# Patient Record
Sex: MALE | Race: WHITE | Employment: FULL TIME | URBAN - METROPOLITAN AREA
[De-identification: names, ages, dates, MRNs, and addresses within clinical notes are randomized per-mention and may not be internally consistent; named-entity substitution may affect disease eponyms.]

---

## 2020-12-23 ENCOUNTER — TELEPHONE (OUTPATIENT)
Dept: ADMINISTRATIVE | Age: 40
End: 2020-12-23

## 2020-12-23 NOTE — TELEPHONE ENCOUNTER
Pt moved back to the area. Was a prev pt of Dr. Dulce Bhatia. Would like to know if he would be able to reestablish care. Please contact pt back with further info.    @ [phone]  Thank You

## 2021-01-11 ENCOUNTER — TELEPHONE (OUTPATIENT)
Dept: ADMINISTRATIVE | Age: 41
End: 2021-01-11

## 2021-01-11 NOTE — TELEPHONE ENCOUNTER
Pt called a few wks ago and LM with after hours line for Dr. Marcos Reyes. Pt was est with him years ago. Moved back to area and would like to re-est if accepting. Please contact pt back @ [phone]. Can LM with wife Martin Chandra. Thank you in advanced!

## 2021-01-14 ENCOUNTER — OFFICE VISIT (OUTPATIENT)
Dept: PRIMARY CARE CLINIC | Age: 41
End: 2021-01-14
Payer: COMMERCIAL

## 2021-01-14 VITALS
HEIGHT: 69 IN | HEART RATE: 80 BPM | WEIGHT: 188 LBS | OXYGEN SATURATION: 97 % | BODY MASS INDEX: 27.85 KG/M2 | TEMPERATURE: 97.2 F | DIASTOLIC BLOOD PRESSURE: 70 MMHG | SYSTOLIC BLOOD PRESSURE: 110 MMHG

## 2021-01-14 DIAGNOSIS — K64.4 EXTERNAL HEMORRHOIDS: Primary | ICD-10-CM

## 2021-01-14 PROCEDURE — G8427 DOCREV CUR MEDS BY ELIG CLIN: HCPCS | Performed by: FAMILY MEDICINE

## 2021-01-14 PROCEDURE — 99202 OFFICE O/P NEW SF 15 MIN: CPT | Performed by: FAMILY MEDICINE

## 2021-01-14 PROCEDURE — 1036F TOBACCO NON-USER: CPT | Performed by: FAMILY MEDICINE

## 2021-01-14 PROCEDURE — G8484 FLU IMMUNIZE NO ADMIN: HCPCS | Performed by: FAMILY MEDICINE

## 2021-01-14 PROCEDURE — G8419 CALC BMI OUT NRM PARAM NOF/U: HCPCS | Performed by: FAMILY MEDICINE

## 2021-01-14 RX ORDER — CLINDAMYCIN HYDROCHLORIDE 150 MG/1
CAPSULE ORAL
COMMUNITY
Start: 2021-01-13 | End: 2022-02-18 | Stop reason: ALTCHOICE

## 2021-01-14 RX ORDER — IBUPROFEN 200 MG
200 TABLET ORAL EVERY 6 HOURS PRN
COMMUNITY
End: 2022-02-18 | Stop reason: ALTCHOICE

## 2021-01-14 SDOH — HEALTH STABILITY: MENTAL HEALTH: HOW MANY STANDARD DRINKS CONTAINING ALCOHOL DO YOU HAVE ON A TYPICAL DAY?: 1 OR 2

## 2021-01-14 SDOH — ECONOMIC STABILITY: TRANSPORTATION INSECURITY
IN THE PAST 12 MONTHS, HAS THE LACK OF TRANSPORTATION KEPT YOU FROM MEDICAL APPOINTMENTS OR FROM GETTING MEDICATIONS?: NOT ASKED

## 2021-01-14 SDOH — HEALTH STABILITY: MENTAL HEALTH: HOW OFTEN DO YOU HAVE A DRINK CONTAINING ALCOHOL?: 2-4 TIMES A MONTH

## 2021-01-14 SDOH — ECONOMIC STABILITY: TRANSPORTATION INSECURITY
IN THE PAST 12 MONTHS, HAS LACK OF TRANSPORTATION KEPT YOU FROM MEETINGS, WORK, OR FROM GETTING THINGS NEEDED FOR DAILY LIVING?: NOT ASKED

## 2021-01-14 SDOH — ECONOMIC STABILITY: FOOD INSECURITY: WITHIN THE PAST 12 MONTHS, YOU WORRIED THAT YOUR FOOD WOULD RUN OUT BEFORE YOU GOT MONEY TO BUY MORE.: NOT ASKED

## 2021-01-14 SDOH — ECONOMIC STABILITY: FOOD INSECURITY: WITHIN THE PAST 12 MONTHS, THE FOOD YOU BOUGHT JUST DIDN'T LAST AND YOU DIDN'T HAVE MONEY TO GET MORE.: NOT ASKED

## 2021-01-14 ASSESSMENT — ENCOUNTER SYMPTOMS
ABDOMINAL PAIN: 0
CONSTIPATION: 0
DIARRHEA: 0
BLOOD IN STOOL: 0

## 2021-01-14 ASSESSMENT — PATIENT HEALTH QUESTIONNAIRE - PHQ9
SUM OF ALL RESPONSES TO PHQ9 QUESTIONS 1 & 2: 0
SUM OF ALL RESPONSES TO PHQ QUESTIONS 1-9: 0

## 2021-01-14 NOTE — PROGRESS NOTES
Holly Barros, a male of 38 y.o. came to the office 1/14/2021. Patient Active Problem List   Diagnosis    Chronic right shoulder pain          HPI external hemorrhoids: noticed before [place] seen at urgent care and given Lidocaine and Prep H with relief. Doesn't sense them now. 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 No Known Allergies    Current Outpatient Medications on File Prior to Visit   Medication Sig Dispense Refill    ibuprofen (ADVIL;MOTRIN) 200 MG tablet Take 200 mg by mouth every 6 hours as needed for Pain      clindamycin (CLEOCIN) 150 MG capsule        No current facility-administered medications on file prior to visit. Review of Systems   Constitutional: Negative for fatigue and unexpected weight change. Gastrointestinal: Negative for abdominal pain, blood in stool, constipation and diarrhea. Endocrine: Negative for polydipsia and polyuria. other review of systems reviewed and are negative    OBJECTIVE:  /70   Pulse 80   Temp 97.2 °F (36.2 °C)   Ht 5' 9\" (1.753 m)   Wt 188 lb (85.3 kg)   SpO2 97%   BMI 27.76 kg/m²      Physical Exam  Vitals signs reviewed. Eyes:      General: No scleral icterus. Conjunctiva/sclera: Conjunctivae normal.   Neck:      Musculoskeletal: Neck supple. Thyroid: No thyromegaly. Vascular: No carotid bruit. Cardiovascular:      Rate and Rhythm: Normal rate and regular rhythm. Heart sounds: No murmur. Pulmonary:      Effort: Pulmonary effort is normal.      Breath sounds: Normal breath sounds. No wheezing or rales. Abdominal:      General: Bowel sounds are normal.      Palpations: Abdomen is soft. There is no mass. Tenderness: There is no abdominal tenderness. There is no guarding or rebound. Genitourinary:     Rectum: No external hemorrhoid. Musculoskeletal: Normal range of motion. Lymphadenopathy:      Cervical: No cervical adenopathy. Skin:     General: Skin is warm and dry. Neurological:      Mental Status: He is alert and oriented to person, place, and time. Psychiatric:         Mood and Affect: Mood normal.         ASSESSMENT AND PLAN:    Yoal Alegria was seen today for establish care and hemorrhoids. Diagnoses and all orders for this visit:    External hemorrhoids    - avoid straining exercises   - no treatment needed. Return if symptoms worsen or fail to improve.     Juni Gutierrez, DO

## 2021-01-18 ENCOUNTER — TELEPHONE (OUTPATIENT)
Dept: ADMINISTRATIVE | Age: 41
End: 2021-01-18

## 2021-03-04 ENCOUNTER — TELEPHONE (OUTPATIENT)
Dept: PRIMARY CARE CLINIC | Age: 41
End: 2021-03-04

## 2021-03-04 DIAGNOSIS — Z30.09 ENCOUNTER FOR VASECTOMY ASSESSMENT: Primary | ICD-10-CM

## 2021-07-12 ENCOUNTER — OFFICE VISIT (OUTPATIENT)
Dept: PRIMARY CARE CLINIC | Age: 41
End: 2021-07-12
Payer: COMMERCIAL

## 2021-07-12 VITALS
HEART RATE: 78 BPM | BODY MASS INDEX: 28.94 KG/M2 | DIASTOLIC BLOOD PRESSURE: 84 MMHG | TEMPERATURE: 97.7 F | WEIGHT: 196 LBS | OXYGEN SATURATION: 98 % | SYSTOLIC BLOOD PRESSURE: 128 MMHG

## 2021-07-12 DIAGNOSIS — Z80.42 FAMILY HISTORY OF PROSTATE CANCER: ICD-10-CM

## 2021-07-12 DIAGNOSIS — Z80.42 FAMILY HISTORY OF PROSTATE CANCER: Primary | ICD-10-CM

## 2021-07-12 LAB — PROSTATE SPECIFIC ANTIGEN: 0.6 NG/ML (ref 0–4)

## 2021-07-12 PROCEDURE — 99212 OFFICE O/P EST SF 10 MIN: CPT | Performed by: FAMILY MEDICINE

## 2021-07-12 PROCEDURE — G8427 DOCREV CUR MEDS BY ELIG CLIN: HCPCS | Performed by: FAMILY MEDICINE

## 2021-07-12 PROCEDURE — 1036F TOBACCO NON-USER: CPT | Performed by: FAMILY MEDICINE

## 2021-07-12 PROCEDURE — G8419 CALC BMI OUT NRM PARAM NOF/U: HCPCS | Performed by: FAMILY MEDICINE

## 2021-07-12 NOTE — PROGRESS NOTES
Elisabeth Srinivasan, a male of 38 y.o. came to the office 7/12/2021. Patient Active Problem List   Diagnosis    Chronic right shoulder pain          HPI : father recently diagnosed with prostate cancer at age 59. Brother age 55 also has elevated psa. No Known Allergies    Current Outpatient Medications on File Prior to Visit   Medication Sig Dispense Refill    ibuprofen (ADVIL;MOTRIN) 200 MG tablet Take 200 mg by mouth every 6 hours as needed for Pain      clindamycin (CLEOCIN) 150 MG capsule        No current facility-administered medications on file prior to visit. Review of Systems   Genitourinary: Negative for difficulty urinating, dysuria, flank pain, frequency, hematuria and urgency. other review of systems reviewed and are negative    OBJECTIVE:  /84   Pulse 78   Temp 97.7 °F (36.5 °C)   Wt 196 lb (88.9 kg)   SpO2 98%   BMI 28.94 kg/m²      Physical Exam  Vitals reviewed. Eyes:      General: No scleral icterus. Conjunctiva/sclera: Conjunctivae normal.   Neck:      Thyroid: No thyromegaly. Cardiovascular:      Rate and Rhythm: Normal rate and regular rhythm. Heart sounds: No murmur heard. Pulmonary:      Effort: Pulmonary effort is normal.      Breath sounds: Normal breath sounds. No wheezing or rales. Abdominal:      General: Bowel sounds are normal.      Palpations: Abdomen is soft. There is no mass. Tenderness: There is no abdominal tenderness. There is no guarding or rebound. Musculoskeletal:         General: Normal range of motion. Cervical back: Neck supple. Skin:     General: Skin is warm and dry. Neurological:      Mental Status: He is alert and oriented to person, place, and time. Psychiatric:         Mood and Affect: Mood normal.         ASSESSMENT AND PLAN:    Lorena Antunez was seen today for other. Diagnoses and all orders for this visit:    Family history of prostate cancer  -     PSA screening;  Future        Return for based on lab results, or for acute problem.     Ricky Gutierrez, DO

## 2022-02-18 ENCOUNTER — OFFICE VISIT (OUTPATIENT)
Dept: PRIMARY CARE CLINIC | Age: 42
End: 2022-02-18
Payer: COMMERCIAL

## 2022-02-18 VITALS
DIASTOLIC BLOOD PRESSURE: 84 MMHG | WEIGHT: 205 LBS | SYSTOLIC BLOOD PRESSURE: 130 MMHG | HEART RATE: 80 BPM | BODY MASS INDEX: 31.07 KG/M2 | HEIGHT: 68 IN | OXYGEN SATURATION: 96 % | TEMPERATURE: 97.3 F

## 2022-02-18 DIAGNOSIS — Z00.00 ANNUAL PHYSICAL EXAM: Primary | ICD-10-CM

## 2022-02-18 DIAGNOSIS — F34.1 DYSTHYMIA: ICD-10-CM

## 2022-02-18 PROCEDURE — 99396 PREV VISIT EST AGE 40-64: CPT | Performed by: FAMILY MEDICINE

## 2022-02-18 PROCEDURE — G8484 FLU IMMUNIZE NO ADMIN: HCPCS | Performed by: FAMILY MEDICINE

## 2022-02-18 RX ORDER — BUPROPION HYDROCHLORIDE 150 MG/1
150 TABLET ORAL EVERY MORNING
Qty: 30 TABLET | Refills: 1 | Status: SHIPPED
Start: 2022-02-18 | End: 2022-03-24 | Stop reason: SDUPTHER

## 2022-02-18 RX ORDER — ALBUTEROL SULFATE 90 UG/1
2 AEROSOL, METERED RESPIRATORY (INHALATION) EVERY 6 HOURS PRN
Qty: 18 G | Refills: 0 | Status: SHIPPED | OUTPATIENT
Start: 2022-02-18

## 2022-02-18 SDOH — ECONOMIC STABILITY: FOOD INSECURITY: WITHIN THE PAST 12 MONTHS, THE FOOD YOU BOUGHT JUST DIDN'T LAST AND YOU DIDN'T HAVE MONEY TO GET MORE.: NEVER TRUE

## 2022-02-18 SDOH — ECONOMIC STABILITY: FOOD INSECURITY: WITHIN THE PAST 12 MONTHS, YOU WORRIED THAT YOUR FOOD WOULD RUN OUT BEFORE YOU GOT MONEY TO BUY MORE.: NEVER TRUE

## 2022-02-18 ASSESSMENT — PATIENT HEALTH QUESTIONNAIRE - PHQ9
SUM OF ALL RESPONSES TO PHQ QUESTIONS 1-9: 11
9. THOUGHTS THAT YOU WOULD BE BETTER OFF DEAD, OR OF HURTING YOURSELF: 0
3. TROUBLE FALLING OR STAYING ASLEEP: 0
7. TROUBLE CONCENTRATING ON THINGS, SUCH AS READING THE NEWSPAPER OR WATCHING TELEVISION: 0
2. FEELING DOWN, DEPRESSED OR HOPELESS: 3
SUM OF ALL RESPONSES TO PHQ QUESTIONS 1-9: 11
5. POOR APPETITE OR OVEREATING: 2
1. LITTLE INTEREST OR PLEASURE IN DOING THINGS: 3
SUM OF ALL RESPONSES TO PHQ QUESTIONS 1-9: 11
SUM OF ALL RESPONSES TO PHQ9 QUESTIONS 1 & 2: 6
4. FEELING TIRED OR HAVING LITTLE ENERGY: 3
6. FEELING BAD ABOUT YOURSELF - OR THAT YOU ARE A FAILURE OR HAVE LET YOURSELF OR YOUR FAMILY DOWN: 0
8. MOVING OR SPEAKING SO SLOWLY THAT OTHER PEOPLE COULD HAVE NOTICED. OR THE OPPOSITE, BEING SO FIGETY OR RESTLESS THAT YOU HAVE BEEN MOVING AROUND A LOT MORE THAN USUAL: 0
10. IF YOU CHECKED OFF ANY PROBLEMS, HOW DIFFICULT HAVE THESE PROBLEMS MADE IT FOR YOU TO DO YOUR WORK, TAKE CARE OF THINGS AT HOME, OR GET ALONG WITH OTHER PEOPLE: 3
SUM OF ALL RESPONSES TO PHQ QUESTIONS 1-9: 11

## 2022-02-18 ASSESSMENT — SOCIAL DETERMINANTS OF HEALTH (SDOH): HOW HARD IS IT FOR YOU TO PAY FOR THE VERY BASICS LIKE FOOD, HOUSING, MEDICAL CARE, AND HEATING?: NOT HARD AT ALL

## 2022-02-18 NOTE — PROGRESS NOTES
SUBJECTIVE:    HPI: Ignacio Lenz  Was seen here today for   Chief Complaint   Patient presents with    Annual Exam    Depression    . He states they are dealing with decrease motivation, occas depression, fatigue, hypersomnolence, low appetite, slight inc agitation. Normal conc, and no guilt. Symptoms for past 2 yrs when covid hit. Past Medical History:   Diagnosis Date    Chronic right shoulder pain        Past Surgical History:   Procedure Laterality Date    WISDOM TOOTH EXTRACTION  1999       Family History   Problem Relation Age of Onset    Arthritis Mother     Cancer Father     Other Brother         skin       Prior to Admission medications    Medication Sig Start Date End Date Taking? Authorizing Provider   buPROPion (WELLBUTRIN XL) 150 MG extended release tablet Take 1 tablet by mouth every morning 2/18/22  Yes Bob Gutierrez DO   albuterol sulfate  (90 Base) MCG/ACT inhaler Inhale 2 puffs into the lungs every 6 hours as needed for Wheezing 2/18/22  Yes [address], DO       Patient has no known allergies. Social History     Tobacco Use    Smoking status: Never Smoker    Smokeless tobacco: Never Used   Substance Use Topics    Alcohol use: Yes         Review Of Systems:    Skin: no abnormal pigmentation, rash, scaling, itching, masses, hair or nail changes  Eyes: no blurring, diplopia, or eye pain  Ears/Nose/Throat: no hearing loss, tinnitus, vertigo, nosebleed, nasal congestion, rhinorrhea, sore throat  Respiratory: no cough, pleuritic chest pain, dyspnea, or wheezing.    occas sob since he had covid in Nov..  Cardiovascular: no chest pain, angina, dyspnea on exertion, orthopnea, PND, palpitations, or claudication  Gastrointestinal: no nausea, vomiting, heartburn, diarrhea, constipation, bloating,  abdominal pain, or blood per rectum  Genitourinary: no urinary urgency, frequency, dysuria, nocturia, hesitancy, or incontinence  Musculoskeletal: no arthritis, arthralgia, myalgia, weakness, or morning stiffness  Neurologic: no paralysis, paresis, paresthesia, seizures, tremors, or headaches  Hematologic/Lymphatic/Immunologic: no anemia, abnormal bleeding/bruising, fever, chills, night sweats, swollen glands, or unexplained weight loss  Endocrine: no heat or cold intolerance and no polyphagia, polydipsia, or polyuria      OBJECTIVE:    VS: /84   Pulse 80   Temp 97.3 °F (36.3 °C)   Ht 5' 8\" (1.727 m)   Wt 205 lb (93 kg)   SpO2 96%   BMI 31.17 kg/m²   General appearance: Alert, Awake, Oriented times 3, no distress  Skin: Warm and dry  Head: Normocephalic. No masses, lesions or tenderness noted  Eyes: Conjunctivae appear normal. PERRL  Ears: External ears normal, TM's clear and intact  Nose/Sinuses: Nares normal. Septum midline. Mucosa normal. No drainage  Oropharynx: Oropharynx clear with no exudate seen  Neck: Neck supple. No jugular venous distension, lymphadenopathy or thyromegaly Trachea midline. No carotid bruits  Lungs: Lungs clear to auscultation bilaterally. No rhonchi, crackles or wheezes  Heart: S1 S2  Regular rate and rhythm. No rub, murmur or gallop  Abdomen: Abdomen soft, non-tender. BS normal. No masses, organomegaly  Extremities: No edema, Peripheral pulses palpable  Musculoskeletal: Muscular strength appears intact. No joint effusion, tenderness, swelling or warmth  Neuro:  No focal motor or sensory deficits. 2+/4 L4 reflexes        ASSESSMENT/PLAN:  Alvaro Pierson was seen today for annual exam and depression. Diagnoses and all orders for this visit:    Annual physical exam    Dysthymia  -     buPROPion (WELLBUTRIN XL) 150 MG extended release tablet; Take 1 tablet by mouth every morning    Other orders  -     albuterol sulfate  (90 Base) MCG/ACT inhaler; Inhale 2 puffs into the lungs every 6 hours as needed for Wheezing     - refuses labs for his PE  - start Wellbutrin    Return in about 1 month (around 3/18/2022).     Payton Starr, DO

## 2022-03-18 ENCOUNTER — OFFICE VISIT (OUTPATIENT)
Dept: PRIMARY CARE CLINIC | Age: 42
End: 2022-03-18
Payer: COMMERCIAL

## 2022-03-18 VITALS
BODY MASS INDEX: 29.8 KG/M2 | DIASTOLIC BLOOD PRESSURE: 84 MMHG | WEIGHT: 196 LBS | SYSTOLIC BLOOD PRESSURE: 126 MMHG | TEMPERATURE: 98 F | OXYGEN SATURATION: 96 % | HEART RATE: 102 BPM

## 2022-03-18 DIAGNOSIS — F34.1 DYSTHYMIA: Primary | ICD-10-CM

## 2022-03-18 DIAGNOSIS — G43.119 INTRACTABLE MIGRAINE WITH AURA WITHOUT STATUS MIGRAINOSUS: ICD-10-CM

## 2022-03-18 PROCEDURE — G8417 CALC BMI ABV UP PARAM F/U: HCPCS | Performed by: FAMILY MEDICINE

## 2022-03-18 PROCEDURE — G8427 DOCREV CUR MEDS BY ELIG CLIN: HCPCS | Performed by: FAMILY MEDICINE

## 2022-03-18 PROCEDURE — G8484 FLU IMMUNIZE NO ADMIN: HCPCS | Performed by: FAMILY MEDICINE

## 2022-03-18 PROCEDURE — 1036F TOBACCO NON-USER: CPT | Performed by: FAMILY MEDICINE

## 2022-03-18 PROCEDURE — 99213 OFFICE O/P EST LOW 20 MIN: CPT | Performed by: FAMILY MEDICINE

## 2022-03-18 RX ORDER — UBROGEPANT 100 MG/1
TABLET ORAL
Qty: 2 TABLET | Refills: 0 | COMMUNITY
Start: 2022-03-18 | End: 2022-07-18 | Stop reason: ALTCHOICE

## 2022-03-18 ASSESSMENT — ENCOUNTER SYMPTOMS
BLURRED VISION: 1
PHOTOPHOBIA: 1
NAUSEA: 1
VOMITING: 1

## 2022-03-18 NOTE — PROGRESS NOTES
Monet Iglesias, a male of 42 y.o. came to the office 3/18/2022. Patient Active Problem List   Diagnosis    Chronic right shoulder pain    Dysthymia    Intractable migraine with aura without status migrainosus          Migraine   This is a chronic problem. The current episode started more than 1 year ago. Episode frequency: 3-4 a month. The pain is located in the retro-orbital region. The pain does not radiate. The pain quality is similar to prior headaches. The quality of the pain is described as throbbing and stabbing. The pain is severe. Associated symptoms include blurred vision, dizziness, nausea, photophobia and vomiting. Pertinent negatives include no phonophobia. Nothing aggravates the symptoms. He has tried NSAIDs for the symptoms. The treatment provided mild relief. depression: thinks he is doing better. More motivated, moods better. Wife has noticed improvement in moods. He's wanting to do things. Less tired. Not sleeping as much. appitite better. Feels 85 % better. No Known Allergies    Current Outpatient Medications on File Prior to Visit   Medication Sig Dispense Refill    buPROPion (WELLBUTRIN XL) 150 MG extended release tablet Take 1 tablet by mouth every morning 30 tablet 1    albuterol sulfate  (90 Base) MCG/ACT inhaler Inhale 2 puffs into the lungs every 6 hours as needed for Wheezing 18 g 0     No current facility-administered medications on file prior to visit. Review of Systems   Constitutional: Negative for activity change, appetite change and fatigue. Eyes: Positive for blurred vision and photophobia. Gastrointestinal: Positive for nausea and vomiting. Neurological: Positive for dizziness. Psychiatric/Behavioral: Positive for dysphoric mood. Negative for agitation, decreased concentration and sleep disturbance. The patient is not nervous/anxious.     other review of systems reviewed and are negative    OBJECTIVE:  /84   Pulse 102   Temp 98 °F (36.7 °C)   Wt 196 lb (88.9 kg)   SpO2 96%   BMI 29.80 kg/m²      Physical Exam  Constitutional:       General: He is not in acute distress. Appearance: Normal appearance. He is not ill-appearing, toxic-appearing or diaphoretic. HENT:      Head: Normocephalic. Eyes:      General: No scleral icterus. Pulmonary:      Effort: Pulmonary effort is normal.   Neurological:      Mental Status: He is alert and oriented to person, place, and time. Cranial Nerves: No cranial nerve deficit. Psychiatric:         Mood and Affect: Mood normal.         ASSESSMENT AND PLAN:    Aleksander Jensen was seen today for depression and follow-up. Diagnoses and all orders for this visit:    Dysthymia    Intractable migraine with aura without status migrainosus  -     Ubrogepant (UBRELVY) 100 MG TABS; One daily prn migraine    - continue current dose of Wellbutrin, call for refill.  - discussed daily meds for headache prevention  - ok to use ibu prn as well for headache. Return in about 4 months (around 7/18/2022).     Roopa Gutierrez,

## 2022-03-24 DIAGNOSIS — F34.1 DYSTHYMIA: ICD-10-CM

## 2022-03-24 RX ORDER — BUPROPION HYDROCHLORIDE 150 MG/1
150 TABLET ORAL EVERY MORNING
Qty: 30 TABLET | Refills: 3 | Status: SHIPPED
Start: 2022-03-24 | End: 2022-07-18 | Stop reason: SDUPTHER

## 2022-03-24 NOTE — TELEPHONE ENCOUNTER
----- Message from [facility] [place] [facility] [place] sent at 3/24/2022  7:55 AM EDT -----  Subject: Refill Request    QUESTIONS  Name of Medication? buPROPion (WELLBUTRIN XL) 150 MG extended release   tablet  Patient-reported dosage and instructions? daily  How many days do you have left? 0  Preferred Pharmacy? [facility]ACY #[id number]  Pharmacy phone number (if available)? [phone]  ---------------------------------------------------------------------------  --------------  CALL BACK INFO  What is the best way for the office to contact you? OK to leave message on   voicemail  Preferred Call Back Phone Number?  [phone]

## 2022-03-30 ENCOUNTER — TELEPHONE (OUTPATIENT)
Dept: PRIMARY CARE CLINIC | Age: 42
End: 2022-03-30

## 2022-03-30 RX ORDER — AMITRIPTYLINE HYDROCHLORIDE 10 MG/1
10 TABLET, FILM COATED ORAL NIGHTLY
Qty: 30 TABLET | Refills: 1 | Status: SHIPPED
Start: 2022-03-30 | End: 2022-05-23

## 2022-03-30 NOTE — TELEPHONE ENCOUNTER
Pt's wife called explaining that the sample given to him for migraines are not working for him. They would like to try the daily one that he can take as a preventive instead. Please advise.

## 2022-05-23 RX ORDER — AMITRIPTYLINE HYDROCHLORIDE 10 MG/1
10 TABLET, FILM COATED ORAL NIGHTLY
Qty: 30 TABLET | Refills: 1 | Status: SHIPPED
Start: 2022-05-23 | End: 2022-08-15

## 2022-07-18 ENCOUNTER — OFFICE VISIT (OUTPATIENT)
Dept: PRIMARY CARE CLINIC | Age: 42
End: 2022-07-18
Payer: COMMERCIAL

## 2022-07-18 VITALS
BODY MASS INDEX: 29.95 KG/M2 | DIASTOLIC BLOOD PRESSURE: 80 MMHG | SYSTOLIC BLOOD PRESSURE: 110 MMHG | HEART RATE: 82 BPM | WEIGHT: 197 LBS | TEMPERATURE: 97.2 F | OXYGEN SATURATION: 98 %

## 2022-07-18 DIAGNOSIS — G43.119 INTRACTABLE MIGRAINE WITH AURA WITHOUT STATUS MIGRAINOSUS: Primary | ICD-10-CM

## 2022-07-18 DIAGNOSIS — F34.1 DYSTHYMIA: ICD-10-CM

## 2022-07-18 PROCEDURE — 1036F TOBACCO NON-USER: CPT | Performed by: FAMILY MEDICINE

## 2022-07-18 PROCEDURE — G8417 CALC BMI ABV UP PARAM F/U: HCPCS | Performed by: FAMILY MEDICINE

## 2022-07-18 PROCEDURE — G8427 DOCREV CUR MEDS BY ELIG CLIN: HCPCS | Performed by: FAMILY MEDICINE

## 2022-07-18 PROCEDURE — 99213 OFFICE O/P EST LOW 20 MIN: CPT | Performed by: FAMILY MEDICINE

## 2022-07-18 RX ORDER — TOPIRAMATE 25 MG/1
TABLET ORAL
Qty: 80 TABLET | Refills: 0 | Status: SHIPPED | OUTPATIENT
Start: 2022-07-18 | End: 2022-08-15

## 2022-07-18 RX ORDER — RIMEGEPANT SULFATE 75 MG/75MG
75 TABLET, ORALLY DISINTEGRATING ORAL DAILY PRN
Qty: 2 TABLET | Refills: 0 | COMMUNITY
Start: 2022-07-18

## 2022-07-18 RX ORDER — BUPROPION HYDROCHLORIDE 150 MG/1
150 TABLET ORAL EVERY MORNING
Qty: 30 TABLET | Refills: 5 | Status: SHIPPED | OUTPATIENT
Start: 2022-07-18

## 2022-07-18 ASSESSMENT — ENCOUNTER SYMPTOMS
NAUSEA: 0
VOMITING: 0
PHOTOPHOBIA: 0

## 2022-08-15 RX ORDER — AMITRIPTYLINE HYDROCHLORIDE 10 MG/1
10 TABLET, FILM COATED ORAL NIGHTLY
Qty: 30 TABLET | Refills: 2 | Status: SHIPPED | OUTPATIENT
Start: 2022-08-15

## 2022-11-15 RX ORDER — AMITRIPTYLINE HYDROCHLORIDE 10 MG/1
10 TABLET, FILM COATED ORAL NIGHTLY
Qty: 30 TABLET | Refills: 1 | Status: SHIPPED | OUTPATIENT
Start: 2022-11-15

## 2023-01-17 RX ORDER — AMITRIPTYLINE HYDROCHLORIDE 10 MG/1
10 TABLET, FILM COATED ORAL NIGHTLY
Qty: 30 TABLET | Refills: 0 | Status: SHIPPED
Start: 2023-01-17 | End: 2023-02-16

## 2023-02-10 ENCOUNTER — OFFICE VISIT (OUTPATIENT)
Dept: PRIMARY CARE CLINIC | Age: 43
End: 2023-02-10
Payer: COMMERCIAL

## 2023-02-10 VITALS
TEMPERATURE: 98 F | WEIGHT: 202.5 LBS | SYSTOLIC BLOOD PRESSURE: 120 MMHG | HEART RATE: 80 BPM | HEIGHT: 68 IN | OXYGEN SATURATION: 97 % | RESPIRATION RATE: 16 BRPM | DIASTOLIC BLOOD PRESSURE: 86 MMHG | BODY MASS INDEX: 30.69 KG/M2

## 2023-02-10 DIAGNOSIS — N50.819 EPIDIDYMAL PAIN: Primary | ICD-10-CM

## 2023-02-10 PROCEDURE — G8427 DOCREV CUR MEDS BY ELIG CLIN: HCPCS | Performed by: FAMILY MEDICINE

## 2023-02-10 PROCEDURE — 1036F TOBACCO NON-USER: CPT | Performed by: FAMILY MEDICINE

## 2023-02-10 PROCEDURE — G8417 CALC BMI ABV UP PARAM F/U: HCPCS | Performed by: FAMILY MEDICINE

## 2023-02-10 PROCEDURE — G8484 FLU IMMUNIZE NO ADMIN: HCPCS | Performed by: FAMILY MEDICINE

## 2023-02-10 PROCEDURE — 99212 OFFICE O/P EST SF 10 MIN: CPT | Performed by: FAMILY MEDICINE

## 2023-02-10 RX ORDER — ALBUTEROL SULFATE 90 UG/1
2 AEROSOL, METERED RESPIRATORY (INHALATION) EVERY 6 HOURS PRN
Qty: 18 G | Refills: 0 | Status: SHIPPED | OUTPATIENT
Start: 2023-02-10

## 2023-02-10 RX ORDER — IBUPROFEN 200 MG
800 TABLET ORAL AS NEEDED
COMMUNITY

## 2023-02-10 ASSESSMENT — PATIENT HEALTH QUESTIONNAIRE - PHQ9
5. POOR APPETITE OR OVEREATING: 0
9. THOUGHTS THAT YOU WOULD BE BETTER OFF DEAD, OR OF HURTING YOURSELF: 0
6. FEELING BAD ABOUT YOURSELF - OR THAT YOU ARE A FAILURE OR HAVE LET YOURSELF OR YOUR FAMILY DOWN: 0
7. TROUBLE CONCENTRATING ON THINGS, SUCH AS READING THE NEWSPAPER OR WATCHING TELEVISION: 0
SUM OF ALL RESPONSES TO PHQ QUESTIONS 1-9: 0
1. LITTLE INTEREST OR PLEASURE IN DOING THINGS: 0
SUM OF ALL RESPONSES TO PHQ9 QUESTIONS 1 & 2: 0
SUM OF ALL RESPONSES TO PHQ QUESTIONS 1-9: 0
8. MOVING OR SPEAKING SO SLOWLY THAT OTHER PEOPLE COULD HAVE NOTICED. OR THE OPPOSITE, BEING SO FIGETY OR RESTLESS THAT YOU HAVE BEEN MOVING AROUND A LOT MORE THAN USUAL: 0
SUM OF ALL RESPONSES TO PHQ QUESTIONS 1-9: 0
3. TROUBLE FALLING OR STAYING ASLEEP: 0
4. FEELING TIRED OR HAVING LITTLE ENERGY: 0
SUM OF ALL RESPONSES TO PHQ QUESTIONS 1-9: 0
2. FEELING DOWN, DEPRESSED OR HOPELESS: 0

## 2023-02-10 NOTE — PROGRESS NOTES
Adelina Holly, a male of 36 y.o. came to the office 2/10/2023. Patient Active Problem List   Diagnosis    Chronic right shoulder pain    Dysthymia    Intractable migraine with aura without status migrainosus          Testicle Pain  This is a new problem. The current episode started in the past 7 days (began several days ago. has had another episode in past.). The problem has been rapidly improving. Pertinent negatives include no chills, fever, rash or urinary symptoms. The symptoms are aggravated by intercourse and standing. He has tried nothing (but had sex 2 days ago and pain is getting better.) for the symptoms. The treatment provided moderate relief. - no mass. No Known Allergies    Current Outpatient Medications on File Prior to Visit   Medication Sig Dispense Refill    ibuprofen (ADVIL;MOTRIN) 200 MG tablet Take 800 mg by mouth as needed for Pain      amitriptyline (ELAVIL) 10 MG tablet TAKE 1 TABLET BY MOUTH NIGHTLY 30 tablet 0    buPROPion (WELLBUTRIN XL) 150 MG extended release tablet Take 1 tablet by mouth every morning 30 tablet 5    topiramate (TOPAMAX) 25 MG tablet Take 1 tablet by mouth daily for 7 days, THEN 1 tablet 2 times daily for 7 days, THEN 2 tablets 2 times daily for 14 days. (Patient not taking: Reported on 2/10/2023) 80 tablet 0    Rimegepant Sulfate (NURTEC) 75 MG TBDP Take 75 mg by mouth daily as needed (headache) (Patient not taking: Reported on 2/10/2023) 2 tablet 0     No current facility-administered medications on file prior to visit. Review of Systems   Constitutional:  Negative for chills and fever. Genitourinary:  Positive for testicular pain. Negative for dysuria, frequency, hematuria, penile discharge, penile pain and urgency. Skin:  Negative for rash.  other review of systems reviewed and are negative    OBJECTIVE:  /86   Pulse 80   Temp 98 °F (36.7 °C)   Resp 16   Ht 5' 8\" (1.727 m)   Wt 202 lb 8 oz (91.9 kg)   SpO2 97%   BMI 30.79 kg/m² Physical Exam  Constitutional:       General: He is not in acute distress. Appearance: Normal appearance. He is not ill-appearing, toxic-appearing or diaphoretic. HENT:      Head: Normocephalic. Eyes:      General: No scleral icterus. Pulmonary:      Effort: Pulmonary effort is normal.   Genitourinary:     Testes:         Right: Tenderness (to palp over epididymitis) present. Mass, testicular hydrocele or varicocele not present. Left: Mass, tenderness, testicular hydrocele or varicocele not present. Epididymis:      Right: Tenderness present. Left: No tenderness. Neurological:      Mental Status: He is alert and oriented to person, place, and time. Psychiatric:         Mood and Affect: Mood normal.       ASSESSMENT AND PLAN:    Pauline Stage was seen today for testicle pain. Diagnoses and all orders for this visit:    Epididymal pain    Other orders  -     albuterol sulfate HFA (PROVENTIL;VENTOLIN;PROAIR) 108 (90 Base) MCG/ACT inhaler; Inhale 2 puffs into the lungs every 6 hours as needed for Wheezing  - monitor pain if returns and doesn't resolve in 1 wk then call. Return if symptoms worsen or fail to improve.     Carly Gutierrez, DO

## 2023-08-08 DIAGNOSIS — F34.1 DYSTHYMIA: ICD-10-CM

## 2023-08-08 RX ORDER — BUPROPION HYDROCHLORIDE 150 MG/1
TABLET ORAL
Qty: 30 TABLET | Refills: 5 | Status: SHIPPED | OUTPATIENT
Start: 2023-08-08

## 2023-08-11 DIAGNOSIS — G43.119 INTRACTABLE MIGRAINE WITH AURA WITHOUT STATUS MIGRAINOSUS: ICD-10-CM

## 2023-08-11 RX ORDER — AMITRIPTYLINE HYDROCHLORIDE 10 MG/1
10 TABLET, FILM COATED ORAL NIGHTLY
Qty: 30 TABLET | Refills: 5 | Status: SHIPPED | OUTPATIENT
Start: 2023-08-11

## 2024-03-13 ENCOUNTER — HOSPITAL ENCOUNTER (EMERGENCY)
Age: 44
Discharge: HOME | End: 2024-03-13
Attending: STUDENT IN AN ORGANIZED HEALTH CARE EDUCATION/TRAINING PROGRAM
Payer: COMMERCIAL

## 2024-03-13 VITALS
OXYGEN SATURATION: 98 % | WEIGHT: 190 LBS | HEIGHT: 67 IN | RESPIRATION RATE: 14 BRPM | BODY MASS INDEX: 29.82 KG/M2 | DIASTOLIC BLOOD PRESSURE: 84 MMHG | HEART RATE: 84 BPM | SYSTOLIC BLOOD PRESSURE: 122 MMHG | TEMPERATURE: 98.4 F

## 2024-03-13 DIAGNOSIS — K04.7 DENTAL ABSCESS: Primary | ICD-10-CM

## 2024-03-13 LAB
ANION GAP SERPL CALCULATED.3IONS-SCNC: 10 MMOL/L (ref 7–16)
BASOPHILS # BLD: 0.04 K/UL (ref 0–0.2)
BASOPHILS NFR BLD: 1 % (ref 0–2)
BUN SERPL-MCNC: 10 MG/DL (ref 6–20)
CALCIUM SERPL-MCNC: 9 MG/DL (ref 8.6–10.2)
CHLORIDE SERPL-SCNC: 103 MMOL/L (ref 98–107)
CO2 SERPL-SCNC: 25 MMOL/L (ref 22–29)
CREAT SERPL-MCNC: 1.1 MG/DL (ref 0.7–1.2)
EOSINOPHIL # BLD: 0.02 K/UL (ref 0.05–0.5)
EOSINOPHILS RELATIVE PERCENT: 0 % (ref 0–6)
ERYTHROCYTE [DISTWIDTH] IN BLOOD BY AUTOMATED COUNT: 13.2 % (ref 11.5–15)
GFR SERPL CREATININE-BSD FRML MDRD: >60 ML/MIN/1.73M2
GLUCOSE SERPL-MCNC: 114 MG/DL (ref 74–99)
HCT VFR BLD AUTO: 45.9 % (ref 37–54)
HGB BLD-MCNC: 14.8 G/DL (ref 12.5–16.5)
IMM GRANULOCYTES # BLD AUTO: 0.03 K/UL (ref 0–0.58)
IMM GRANULOCYTES NFR BLD: 0 % (ref 0–5)
LYMPHOCYTES NFR BLD: 1.44 K/UL (ref 1.5–4)
LYMPHOCYTES RELATIVE PERCENT: 19 % (ref 20–42)
MCH RBC QN AUTO: 28.5 PG (ref 26–35)
MCHC RBC AUTO-ENTMCNC: 32.2 G/DL (ref 32–34.5)
MCV RBC AUTO: 88.3 FL (ref 80–99.9)
MONOCYTES NFR BLD: 0.71 K/UL (ref 0.1–0.95)
MONOCYTES NFR BLD: 9 % (ref 2–12)
NEUTROPHILS NFR BLD: 71 % (ref 43–80)
NEUTS SEG NFR BLD: 5.49 K/UL (ref 1.8–7.3)
PLATELET # BLD AUTO: 195 K/UL (ref 130–450)
PMV BLD AUTO: 11.6 FL (ref 7–12)
POTASSIUM SERPL-SCNC: 4 MMOL/L (ref 3.5–5)
RBC # BLD AUTO: 5.2 M/UL (ref 3.8–5.8)
SODIUM SERPL-SCNC: 138 MMOL/L (ref 132–146)
WBC OTHER # BLD: 7.7 K/UL (ref 4.5–11.5)

## 2024-03-13 PROCEDURE — 80048 BASIC METABOLIC PNL TOTAL CA: CPT

## 2024-03-13 PROCEDURE — 99283 EMERGENCY DEPT VISIT LOW MDM: CPT

## 2024-03-13 PROCEDURE — 85025 COMPLETE CBC W/AUTO DIFF WBC: CPT

## 2024-03-13 PROCEDURE — 6370000000 HC RX 637 (ALT 250 FOR IP): Performed by: STUDENT IN AN ORGANIZED HEALTH CARE EDUCATION/TRAINING PROGRAM

## 2024-03-13 RX ORDER — ACETAMINOPHEN 325 MG/1
650 TABLET ORAL ONCE
Status: COMPLETED | OUTPATIENT
Start: 2024-03-13 | End: 2024-03-13

## 2024-03-13 RX ORDER — AMOXICILLIN AND CLAVULANATE POTASSIUM 875; 125 MG/1; MG/1
1 TABLET, FILM COATED ORAL ONCE
Status: COMPLETED | OUTPATIENT
Start: 2024-03-13 | End: 2024-03-13

## 2024-03-13 RX ORDER — AMOXICILLIN AND CLAVULANATE POTASSIUM 875; 125 MG/1; MG/1
1 TABLET, FILM COATED ORAL 2 TIMES DAILY
Qty: 20 TABLET | Refills: 0 | Status: SHIPPED | OUTPATIENT
Start: 2024-03-13 | End: 2024-03-23

## 2024-03-13 RX ADMIN — AMOXICILLIN AND CLAVULANATE POTASSIUM 1 TABLET: 875; 125 TABLET, FILM COATED ORAL at 12:17

## 2024-03-13 RX ADMIN — ACETAMINOPHEN 650 MG: 325 TABLET ORAL at 10:38

## 2024-03-13 ASSESSMENT — ENCOUNTER SYMPTOMS
COUGH: 0
SORE THROAT: 0
SHORTNESS OF BREATH: 0
DIARRHEA: 0
WHEEZING: 0
NAUSEA: 0
BACK PAIN: 0
VOMITING: 0
EYE DISCHARGE: 0
EYE PAIN: 0
FACIAL SWELLING: 1
SINUS PRESSURE: 0
ABDOMINAL PAIN: 0
EYE REDNESS: 0

## 2024-03-13 ASSESSMENT — LIFESTYLE VARIABLES
HOW OFTEN DO YOU HAVE A DRINK CONTAINING ALCOHOL: NEVER
HOW MANY STANDARD DRINKS CONTAINING ALCOHOL DO YOU HAVE ON A TYPICAL DAY: PATIENT DOES NOT DRINK

## 2024-03-13 ASSESSMENT — PAIN SCALES - GENERAL: PAINLEVEL_OUTOF10: 0

## 2024-03-13 NOTE — ED PROVIDER NOTES
Department of Emergency Medicine   ED  Provider Note  Admit Date/RoomTime: 3/13/2024  9:49 AM  ED Room: /[facility]     Dhiraj Cabrera is a 43 y.o. male with a PMHx significant for  migraines  who presents for evaluation of right sided facial swelling, beginning prior to arrival.  The complaint has been persistent, moderate in severity, and worsened by nothing.   The patient states that he started developing dental pain a couple days ago.  Saw his dentist, started on clindamycin.  States he has had it for 1 full day already.  States that upon falling asleep yesterday and waking up this morning he developed worsening right-sided facial swelling and has decreased pain.  Denies any hard time swallowing and eating.  Denies any fevers, chills, chest pain or shortness of breath.     Review of Systems   Constitutional:  Negative for chills and fever.   HENT:  Positive for facial swelling. Negative for ear pain, sinus pressure and sore throat.    Eyes:  Negative for pain, discharge and redness.   Respiratory:  Negative for cough, shortness of breath and wheezing.    Cardiovascular:  Negative for chest pain.   Gastrointestinal:  Negative for abdominal pain, diarrhea, nausea and vomiting.   Genitourinary:  Negative for dysuria and frequency.   Musculoskeletal:  Negative for arthralgias and back pain.   Skin:  Negative for rash and wound.   Neurological:  Negative for weakness and headaches.   Hematological:  Negative for adenopathy.   All other systems reviewed and are negative.       Physical Exam  Vitals and nursing note reviewed.   Constitutional:       General: He is not in acute distress.     Appearance: Normal appearance. He is well-developed. He is not ill-appearing.   HENT:      Head: Normocephalic and atraumatic.      Right Ear: External ear normal.      Left Ear: External ear normal.      Mouth/Throat:      Comments: Right sided facial swelling  No trismus  Eyes:      General:         Right eye: No

## 2024-06-26 DIAGNOSIS — F34.1 DYSTHYMIA: ICD-10-CM

## 2024-06-26 RX ORDER — BUPROPION HYDROCHLORIDE 150 MG/1
TABLET ORAL
Qty: 30 TABLET | Refills: 5 | OUTPATIENT
Start: 2024-06-26

## 2024-07-18 ENCOUNTER — OFFICE VISIT (OUTPATIENT)
Dept: PRIMARY CARE CLINIC | Age: 44
End: 2024-07-18
Payer: COMMERCIAL

## 2024-07-18 VITALS
HEART RATE: 99 BPM | OXYGEN SATURATION: 98 % | BODY MASS INDEX: 32.42 KG/M2 | WEIGHT: 207 LBS | SYSTOLIC BLOOD PRESSURE: 120 MMHG | TEMPERATURE: 97.5 F | DIASTOLIC BLOOD PRESSURE: 64 MMHG

## 2024-07-18 DIAGNOSIS — F34.1 DYSTHYMIA: ICD-10-CM

## 2024-07-18 DIAGNOSIS — N50.89 SCROTAL MASS: Primary | ICD-10-CM

## 2024-07-18 DIAGNOSIS — G43.119 INTRACTABLE MIGRAINE WITH AURA WITHOUT STATUS MIGRAINOSUS: ICD-10-CM

## 2024-07-18 PROCEDURE — 99213 OFFICE O/P EST LOW 20 MIN: CPT | Performed by: FAMILY MEDICINE

## 2024-07-18 PROCEDURE — G8427 DOCREV CUR MEDS BY ELIG CLIN: HCPCS | Performed by: FAMILY MEDICINE

## 2024-07-18 PROCEDURE — G8419 CALC BMI OUT NRM PARAM NOF/U: HCPCS | Performed by: FAMILY MEDICINE

## 2024-07-18 PROCEDURE — 1036F TOBACCO NON-USER: CPT | Performed by: FAMILY MEDICINE

## 2024-07-18 RX ORDER — AMITRIPTYLINE HYDROCHLORIDE 10 MG/1
20 TABLET, FILM COATED ORAL NIGHTLY
Qty: 60 TABLET | Refills: 5 | Status: SHIPPED | OUTPATIENT
Start: 2024-07-18

## 2024-07-18 RX ORDER — BUPROPION HYDROCHLORIDE 150 MG/1
150 TABLET ORAL EVERY MORNING
Qty: 30 TABLET | Refills: 5 | Status: SHIPPED | OUTPATIENT
Start: 2024-07-18

## 2024-07-18 RX ORDER — AMITRIPTYLINE HYDROCHLORIDE 10 MG/1
10 TABLET, FILM COATED ORAL NIGHTLY
Qty: 30 TABLET | Refills: 5 | Status: SHIPPED
Start: 2024-07-18 | End: 2024-07-18 | Stop reason: DRUGHIGH

## 2024-07-18 SDOH — ECONOMIC STABILITY: INCOME INSECURITY: HOW HARD IS IT FOR YOU TO PAY FOR THE VERY BASICS LIKE FOOD, HOUSING, MEDICAL CARE, AND HEATING?: NOT HARD AT ALL

## 2024-07-18 SDOH — ECONOMIC STABILITY: FOOD INSECURITY: WITHIN THE PAST 12 MONTHS, THE FOOD YOU BOUGHT JUST DIDN'T LAST AND YOU DIDN'T HAVE MONEY TO GET MORE.: NEVER TRUE

## 2024-07-18 SDOH — ECONOMIC STABILITY: HOUSING INSECURITY
IN THE LAST 12 MONTHS, WAS THERE A TIME WHEN YOU DID NOT HAVE A STEADY PLACE TO SLEEP OR SLEPT IN A SHELTER (INCLUDING NOW)?: NO

## 2024-07-18 SDOH — ECONOMIC STABILITY: FOOD INSECURITY: WITHIN THE PAST 12 MONTHS, YOU WORRIED THAT YOUR FOOD WOULD RUN OUT BEFORE YOU GOT MONEY TO BUY MORE.: NEVER TRUE

## 2024-07-18 ASSESSMENT — PATIENT HEALTH QUESTIONNAIRE - PHQ9
2. FEELING DOWN, DEPRESSED OR HOPELESS: NOT AT ALL
3. TROUBLE FALLING OR STAYING ASLEEP: NOT AT ALL
5. POOR APPETITE OR OVEREATING: NOT AT ALL
SUM OF ALL RESPONSES TO PHQ QUESTIONS 1-9: 0
9. THOUGHTS THAT YOU WOULD BE BETTER OFF DEAD, OR OF HURTING YOURSELF: NOT AT ALL
10. IF YOU CHECKED OFF ANY PROBLEMS, HOW DIFFICULT HAVE THESE PROBLEMS MADE IT FOR YOU TO DO YOUR WORK, TAKE CARE OF THINGS AT HOME, OR GET ALONG WITH OTHER PEOPLE: NOT DIFFICULT AT ALL
SUM OF ALL RESPONSES TO PHQ QUESTIONS 1-9: 0
SUM OF ALL RESPONSES TO PHQ QUESTIONS 1-9: 0
SUM OF ALL RESPONSES TO PHQ9 QUESTIONS 1 & 2: 0
1. LITTLE INTEREST OR PLEASURE IN DOING THINGS: NOT AT ALL
4. FEELING TIRED OR HAVING LITTLE ENERGY: NOT AT ALL
SUM OF ALL RESPONSES TO PHQ QUESTIONS 1-9: 0
7. TROUBLE CONCENTRATING ON THINGS, SUCH AS READING THE NEWSPAPER OR WATCHING TELEVISION: NOT AT ALL
8. MOVING OR SPEAKING SO SLOWLY THAT OTHER PEOPLE COULD HAVE NOTICED. OR THE OPPOSITE, BEING SO FIGETY OR RESTLESS THAT YOU HAVE BEEN MOVING AROUND A LOT MORE THAN USUAL: NOT AT ALL
6. FEELING BAD ABOUT YOURSELF - OR THAT YOU ARE A FAILURE OR HAVE LET YOURSELF OR YOUR FAMILY DOWN: NOT AT ALL

## 2024-07-18 NOTE — PROGRESS NOTES
He is not ill-appearing, toxic-appearing or diaphoretic.   HENT:      Head: Normocephalic.   Eyes:      General: No scleral icterus.  Pulmonary:      Effort: Pulmonary effort is normal.   Genitourinary:     Testes:         Right: Mass (oval shaped soft 1 x 2.5 cm mass inferior to R testicle.) present.         Left: Mass not present.   Neurological:      Mental Status: He is alert and oriented to person, place, and time.   Psychiatric:         Mood and Affect: Mood normal.         ASSESSMENT AND PLAN:    Dhiraj was seen today for testicle pain.    Diagnoses and all orders for this visit:    Scrotal mass  -     US SCROTUM AND TESTICLES; Future    Dysthymia  -     buPROPion (WELLBUTRIN XL) 150 MG extended release tablet; Take 1 tablet by mouth every morning    Intractable migraine with aura without status migrainosus  -     Discontinue: amitriptyline (ELAVIL) 10 MG tablet; Take 1 tablet by mouth nightly  -     amitriptyline (ELAVIL) 10 MG tablet; Take 2 tablets by mouth nightly    - increase Elavil to 20 mg nightly for ha prevention  - I think he has some type of scrotal cyst.     Return if symptoms worsen or fail to improve, for based on results.    Bob Gutierrez, DO

## 2024-07-29 ENCOUNTER — HOSPITAL ENCOUNTER (OUTPATIENT)
Dept: ULTRASOUND IMAGING | Age: 44
Discharge: HOME | End: 2024-07-31
Attending: FAMILY MEDICINE
Payer: COMMERCIAL

## 2024-07-29 DIAGNOSIS — N50.89 SCROTAL MASS: ICD-10-CM

## 2024-07-29 PROCEDURE — 76870 US EXAM SCROTUM: CPT

## 2024-07-30 ENCOUNTER — TELEPHONE (OUTPATIENT)
Dept: PRIMARY CARE CLINIC | Age: 44
End: 2024-07-30

## 2024-07-30 NOTE — TELEPHONE ENCOUNTER
Spoke with patient testicular ultrasound shows small bilateral hydroceles.  I recommend just monitoring this unless they are causing him pain or getting bigger.  He is in agreement with this.  Follow-up as needed

## 2024-08-02 ENCOUNTER — TELEPHONE (OUTPATIENT)
Dept: PRIMARY CARE CLINIC | Age: 44
End: 2024-08-02

## 2024-08-02 RX ORDER — AMOXICILLIN 500 MG/1
500 CAPSULE ORAL 3 TIMES DAILY
Qty: 21 CAPSULE | Refills: 0 | Status: SHIPPED | OUTPATIENT
Start: 2024-08-02 | End: 2024-08-09

## 2024-08-02 NOTE — TELEPHONE ENCOUNTER
Pt called in stating that his dentist put him on the antibiotic Clindamycin to take for 5 days before his root canal. He states that medication is not working and was wondering if you could send in something else because his dentist is not open today.    [facility] in [place]

## 2024-08-02 NOTE — TELEPHONE ENCOUNTER
L/m on voicemail letting him know that Dr Gutierrez did send in a new antibiotic into his pharmacy

## 2025-02-25 DIAGNOSIS — F34.1 DYSTHYMIA: ICD-10-CM

## 2025-02-25 RX ORDER — BUPROPION HYDROCHLORIDE 150 MG/1
150 TABLET ORAL EVERY MORNING
Qty: 30 TABLET | Refills: 5 | Status: SHIPPED | OUTPATIENT
Start: 2025-02-25

## 2025-03-26 DIAGNOSIS — G43.119 INTRACTABLE MIGRAINE WITH AURA WITHOUT STATUS MIGRAINOSUS: ICD-10-CM

## 2025-03-26 RX ORDER — AMITRIPTYLINE HYDROCHLORIDE 10 MG/1
TABLET ORAL
Qty: 60 TABLET | Refills: 3 | Status: SHIPPED | OUTPATIENT
Start: 2025-03-26

## 2025-03-26 NOTE — TELEPHONE ENCOUNTER
Patients last appointment 7/18/2024.  Patients next scheduled appointment No future appointments.